# Patient Record
Sex: MALE | URBAN - METROPOLITAN AREA
[De-identification: names, ages, dates, MRNs, and addresses within clinical notes are randomized per-mention and may not be internally consistent; named-entity substitution may affect disease eponyms.]

---

## 2024-11-05 ENCOUNTER — TELEPHONE (OUTPATIENT)
Dept: TRANSPLANT | Facility: CLINIC | Age: 54
End: 2024-11-05
Payer: COMMERCIAL

## 2024-11-05 DIAGNOSIS — I50.9 CONGESTIVE HEART FAILURE, UNSPECIFIED HF CHRONICITY, UNSPECIFIED HEART FAILURE TYPE: Primary | ICD-10-CM

## 2025-01-30 ENCOUNTER — HOSPITAL ENCOUNTER (OUTPATIENT)
Dept: PULMONOLOGY | Facility: CLINIC | Age: 55
Discharge: HOME | End: 2025-01-30
Attending: INTERNAL MEDICINE
Payer: COMMERCIAL

## 2025-01-30 ENCOUNTER — OFFICE VISIT (OUTPATIENT)
Dept: TRANSPLANT | Facility: CLINIC | Age: 55
End: 2025-01-30
Attending: INTERNAL MEDICINE
Payer: COMMERCIAL

## 2025-01-30 ENCOUNTER — HOSPITAL ENCOUNTER (OUTPATIENT)
Dept: CARDIOLOGY | Facility: HOSPITAL | Age: 55
Discharge: HOME | End: 2025-01-30
Attending: INTERNAL MEDICINE
Payer: COMMERCIAL

## 2025-01-30 VITALS
WEIGHT: 257.94 LBS | HEART RATE: 70 BPM | BODY MASS INDEX: 33.37 KG/M2 | HEIGHT: 74 IN | DIASTOLIC BLOOD PRESSURE: 80 MMHG | HEIGHT: 74 IN | WEIGHT: 260 LBS | SYSTOLIC BLOOD PRESSURE: 113 MMHG | BODY MASS INDEX: 33.1 KG/M2

## 2025-01-30 VITALS
WEIGHT: 261.44 LBS | BODY MASS INDEX: 33.55 KG/M2 | SYSTOLIC BLOOD PRESSURE: 128 MMHG | HEART RATE: 69 BPM | DIASTOLIC BLOOD PRESSURE: 86 MMHG | HEIGHT: 74 IN

## 2025-01-30 DIAGNOSIS — Z86.73 HISTORY OF CARDIOEMBOLIC STROKE: ICD-10-CM

## 2025-01-30 DIAGNOSIS — I50.22 CHRONIC SYSTOLIC CONGESTIVE HEART FAILURE: Primary | ICD-10-CM

## 2025-01-30 DIAGNOSIS — I50.9 CONGESTIVE HEART FAILURE, UNSPECIFIED HF CHRONICITY, UNSPECIFIED HEART FAILURE TYPE: ICD-10-CM

## 2025-01-30 DIAGNOSIS — E78.2 MIXED HYPERLIPIDEMIA: ICD-10-CM

## 2025-01-30 DIAGNOSIS — I42.8 NONISCHEMIC CARDIOMYOPATHY: ICD-10-CM

## 2025-01-30 DIAGNOSIS — I10 PRIMARY HYPERTENSION: ICD-10-CM

## 2025-01-30 LAB
ASCENDING AORTA: 4.11 CM
AV AREA BY CONTINUOUS VTI: 4.8 CM2
AV INDEX (PROSTH): 1.01
AV LVOT MEAN GRADIENT: 3 MMHG
AV LVOT PEAK GRADIENT: 5 MMHG
AV MEAN GRADIENT: 3 MMHG
AV PEAK GRADIENT: 5 MMHG
AV VALVE AREA BY VELOCITY RATIO: 4.9 CM²
AV VALVE AREA: 5 CM2
AV VELOCITY RATIO: 1
BSA FOR ECHO PROCEDURE: 2.47 M2
CV ECHO LV RWT: 0.31 CM
DOP CALC AO PEAK VEL: 1.1 M/S
DOP CALC AO VTI: 16.6 CM
DOP CALC LVOT AREA: 4.9 CM2
DOP CALC LVOT DIAMETER: 2.5 CM
DOP CALC LVOT PEAK VEL: 1.1 M/S
DOP CALC LVOT STROKE VOLUME: 82.4 CM3
DOP CALCLVOT PEAK VEL VTI: 16.8 CM
E WAVE DECELERATION TIME: 229 MS
E/A RATIO: 0.63
E/E' RATIO: 10 M/S
ECHO EF ESTIMATED: 10 %
ECHO LV POSTERIOR WALL: 0.9 CM (ref 0.6–1.1)
FRACTIONAL SHORTENING: 6.8 % (ref 28–44)
INTERVENTRICULAR SEPTUM: 0.9 CM (ref 0.6–1.1)
IVC DIAMETER: 1.34 CM
IVRT: 140 MS
LA MAJOR: 5.8 CM
LA MINOR: 5.8 CM
LA WIDTH: 4.6 CM
LEFT ATRIUM SIZE: 4.6 CM
LEFT ATRIUM VOLUME INDEX: 43 ML/M2
LEFT ATRIUM VOLUME: 104 CM3
LEFT INTERNAL DIMENSION IN SYSTOLE: 5.5 CM (ref 2.1–4)
LEFT VENTRICLE DIASTOLIC VOLUME INDEX: 68.27 ML/M2
LEFT VENTRICLE DIASTOLIC VOLUME: 165.21 ML
LEFT VENTRICLE MASS INDEX: 86.6 G/M2
LEFT VENTRICLE SYSTOLIC VOLUME INDEX: 61.6 ML/M2
LEFT VENTRICLE SYSTOLIC VOLUME: 149.04 ML
LEFT VENTRICULAR INTERNAL DIMENSION IN DIASTOLE: 5.9 CM (ref 3.5–6)
LEFT VENTRICULAR MASS: 209.6 G
LV LATERAL E/E' RATIO: 8.6
LV SEPTAL E/E' RATIO: 10.8
MV PEAK A VEL: 0.68 M/S
MV PEAK E VEL: 0.43 M/S
OHS CV RV/LV RATIO: 0.73 CM
PULM VEIN S/D RATIO: 1.54
PV PEAK D VEL: 0.24 M/S
PV PEAK S VEL: 0.37 M/S
RA MAJOR: 5.08 CM
RA PRESSURE ESTIMATED: 3 MMHG
RA WIDTH: 4.39 CM
RIGHT ATRIAL AREA: 19.8 CM2
RIGHT VENTRICLE DIASTOLIC BASEL DIMENSION: 4.3 CM
RV TISSUE DOPPLER FREE WALL SYSTOLIC VELOCITY 1 (APICAL 4 CHAMBER VIEW): 12.48 CM/S
SINUS: 4.14 CM
STJ: 3.71 CM
TDI LATERAL: 0.05 M/S
TDI SEPTAL: 0.04 M/S
TDI: 0.05 M/S
TRICUSPID ANNULAR PLANE SYSTOLIC EXCURSION: 1.99 CM
Z-SCORE OF LEFT VENTRICULAR DIMENSION IN END DIASTOLE: -6.62
Z-SCORE OF LEFT VENTRICULAR DIMENSION IN END SYSTOLE: -1.72

## 2025-01-30 PROCEDURE — 99205 OFFICE O/P NEW HI 60 MIN: CPT | Mod: S$GLB,,, | Performed by: INTERNAL MEDICINE

## 2025-01-30 PROCEDURE — 1160F RVW MEDS BY RX/DR IN RCRD: CPT | Mod: CPTII,S$GLB,, | Performed by: INTERNAL MEDICINE

## 2025-01-30 PROCEDURE — 3079F DIAST BP 80-89 MM HG: CPT | Mod: CPTII,S$GLB,, | Performed by: INTERNAL MEDICINE

## 2025-01-30 PROCEDURE — C8929 TTE W OR WO FOL WCON,DOPPLER: HCPCS | Mod: TXP

## 2025-01-30 PROCEDURE — 4010F ACE/ARB THERAPY RXD/TAKEN: CPT | Mod: CPTII,S$GLB,, | Performed by: INTERNAL MEDICINE

## 2025-01-30 PROCEDURE — 3008F BODY MASS INDEX DOCD: CPT | Mod: CPTII,S$GLB,, | Performed by: INTERNAL MEDICINE

## 2025-01-30 PROCEDURE — 3074F SYST BP LT 130 MM HG: CPT | Mod: CPTII,S$GLB,, | Performed by: INTERNAL MEDICINE

## 2025-01-30 PROCEDURE — 94618 PULMONARY STRESS TESTING: CPT | Mod: S$GLB,,, | Performed by: INTERNAL MEDICINE

## 2025-01-30 PROCEDURE — 1159F MED LIST DOCD IN RCRD: CPT | Mod: CPTII,S$GLB,, | Performed by: INTERNAL MEDICINE

## 2025-01-30 PROCEDURE — 93306 TTE W/DOPPLER COMPLETE: CPT | Mod: 26,NTX,, | Performed by: INTERNAL MEDICINE

## 2025-01-30 PROCEDURE — 99999 PR PBB SHADOW E&M-EST. PATIENT-LVL IV: CPT | Mod: PBBFAC,TXP,, | Performed by: INTERNAL MEDICINE

## 2025-01-30 RX ORDER — EMPAGLIFLOZIN 10 MG/1
10 TABLET, FILM COATED ORAL
COMMUNITY
Start: 2024-11-18

## 2025-01-30 RX ORDER — SACUBITRIL AND VALSARTAN 49; 51 MG/1; MG/1
1 TABLET, FILM COATED ORAL 2 TIMES DAILY
COMMUNITY
End: 2025-01-30 | Stop reason: DRUGHIGH

## 2025-01-30 RX ORDER — GEMFIBROZIL 600 MG/1
600 TABLET, FILM COATED ORAL 2 TIMES DAILY
COMMUNITY

## 2025-01-30 RX ORDER — ASPIRIN 81 MG/1
81 TABLET ORAL
COMMUNITY

## 2025-01-30 RX ORDER — APIXABAN 5 MG/1
5 TABLET, FILM COATED ORAL 2 TIMES DAILY
COMMUNITY

## 2025-01-30 RX ORDER — SACUBITRIL AND VALSARTAN 97; 103 MG/1; MG/1
1 TABLET, FILM COATED ORAL 2 TIMES DAILY
Qty: 60 TABLET | Refills: 2 | Status: SHIPPED | OUTPATIENT
Start: 2025-01-30

## 2025-01-30 RX ORDER — METOPROLOL SUCCINATE 25 MG/1
25 TABLET, EXTENDED RELEASE ORAL DAILY
COMMUNITY

## 2025-01-30 RX ORDER — SPIRONOLACTONE 25 MG/1
1 TABLET ORAL DAILY
COMMUNITY
Start: 2024-11-11

## 2025-01-30 RX ORDER — SILDENAFIL 50 MG/1
50 TABLET, FILM COATED ORAL DAILY PRN
COMMUNITY

## 2025-01-30 RX ORDER — ATORVASTATIN CALCIUM 20 MG/1
20 TABLET, FILM COATED ORAL DAILY
COMMUNITY

## 2025-01-30 NOTE — PATIENT INSTRUCTIONS
Increase Entresto to  mg twice a day    Get lab check on this dose 2-3 weeks later    Return here 2 months later for special stress test

## 2025-01-30 NOTE — LETTER
January 30, 2025        Guero Concepcion  [address] [address]  [place] [address]  Phone: [phone]  Fax: [phone]             [id number]  [address]  [place] [address]  Phone: [phone]   Patient: Jim Lang   MR Number: [id number]   YOB: 1970   Date of Visit: 1/30/2025       Dear Dr. Guero Concepcion    Thank you for referring Jim Lang to me for evaluation. Attached you will find relevant portions of my assessment and plan of care.    If you have questions, please do not hesitate to call me. I look forward to following Jim Lang along with you.    Sincerely,    Angel Santillan Jr, MD    Enclosure    If you would like to receive this communication electronically, please contact [email or web address] or [phone] to request [employer] Link access.    [employer] Link is a tool which provides read-only access to select patient information with whom you have a relationship. Its easy to use and provides real time access to review your patients record including encounter summaries, notes, results, and demographic information.    If you feel you have received this communication in error or would no longer like to receive these types of communications, please e-mail [email or web address]

## 2025-01-30 NOTE — PROGRESS NOTES
Subjective:   Initial evaluation of heart transplant candidacy.    HPI:  Mr. Lang is a 55 y.o. year old Black or  male who has presents to be considered for advanced surgical options (LVAD/OHT) upon referral by Dr. Dante Ponce.  Failure due to a nonischemic cardiomyopathy.  He reports being told that his heart was enlarged at the age of 3.   He has suffered with heart failure for 7 years in an angiogram in 2018 did not demonstrate any coronary artery disease.  Most recent cardiac issue was a stroke May 31, 2024 and since that time he has been on Eliquis.  He does not recall being told anything about atrial fibrillation.  Presumably this was a cardioembolic source since he was treated with Eliquis.  He lives in [place] but went to [facility] in [place] when he suffered this stroke he was transferred there.    As a rule he does not experience any dyspnea on exertion.  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  He walks 1 mi every morning and 2 miles every evening at 820 minute/mile pace.  He denies PND  But sleeps on 2 pillows though this seems more related to general comfort than breathing.  No symptomatic arrhythmia.      PAST MEDICAL HISTORY:   Congestive heart failure   Nonischemic cardiomyopathy   Stroke May 31, 2024 presumably cardioembolic based upon available information without known atrial arrhythmia   Hypertension   Hyperlipidemia     OPERATIONS:   Placement of ICD    Current Outpatient Medications on File Prior to Visit   Medication Sig Dispense Refill    aspirin (ECOTRIN) 81 MG EC tablet Take 81 mg by mouth.      atorvastatin (LIPITOR) 20 MG tablet Take 20 mg by mouth once daily.      ELIQUIS 5 mg Tab Take 5 mg by mouth 2 (two) times daily.      gemfibroziL (LOPID) 600 MG tablet Take 600 mg by mouth 2 (two) times daily.      JARDIANCE 10 mg tablet Take 10 mg by mouth.      metoprolol succinate (TOPROL-XL) 25 MG 24  "hr tablet Take 25 mg by mouth once daily.      sildenafiL (VIAGRA) 50 MG tablet Take 50 mg by mouth daily as needed.      spironolactone (ALDACTONE) 25 MG tablet Take 1 tablet by mouth once daily.      ENTRESTO 49-51 mg per tablet Take 1 tablet by mouth 2 (two) times daily.       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  His father side of the family did have a history of heart disease and 1 paternal uncle  at the age of 62 of heart failure.  No other details available.    Mother 80 has hypertension   1 brother with lung disease   2 sisters hypertension     SOCIAL HISTORY:   He smokes 2 to 3 packs of cigarettes over the course of 1 week.  He quit smoking in 2018.    He drank on average 5 beers per day though he quit 4 years ago with a diagnosis of heart failure.    Work history as in HPI    Review of Systems   Constitutional: Negative for malaise/fatigue, weight gain and weight loss.   Respiratory:  Negative for cough and sputum production.    Hematologic/Lymphatic: Does not bruise/bleed easily.   Gastrointestinal:  Positive for constipation (occ but nothing of note). Negative for diarrhea, hematemesis and melena.   Neurological:  Negative for dizziness, focal weakness, light-headedness, paresthesias, sensory change and weakness.     Objective:   Blood pressure 128/86, pulse 69, height 6' 2" (1.88 m), weight 118.6 kg (261 lb 7.5 oz).body mass index is 33.57 kg/m².    Physical Exam  Constitutional:       Appearance: He is well-developed.      Comments: /86 (BP Location: Left arm, Patient Position: Sitting)   Pulse 69   Ht 6' 2" (1.88 m)   Wt 118.6 kg (261 lb 7.5 oz)   BMI 33.57 kg/m²    Friendly black male in no acute distress   HENT:      Head: Normocephalic and atraumatic.   Eyes:      General: No scleral icterus. "        Right eye: No discharge.         Left eye: No discharge.      Conjunctiva/sclera: Conjunctivae normal.   Neck:      Thyroid: No thyromegaly.      Vascular: No JVD.      Trachea: No tracheal deviation.   Cardiovascular:      Rate and Rhythm: Normal rate and regular rhythm.      Heart sounds: No murmur heard.     No gallop.   Pulmonary:      Effort: Pulmonary effort is normal.      Breath sounds: Normal breath sounds.   Abdominal:      General: Bowel sounds are normal. There is no distension.      Palpations: Abdomen is soft. There is no mass.      Tenderness: There is no abdominal tenderness. There is no guarding or rebound.   Musculoskeletal:         General: No swelling or tenderness.      Right lower leg: No edema.      Left lower leg: No edema.   Skin:     General: Skin is warm and dry.   Neurological:      General: No focal deficit present.      Mental Status: He is alert and oriented to person, place, and time. Mental status is at baseline.      Comments: Grossly intact   Psychiatric:         Mood and Affect: Mood normal.         Behavior: Behavior normal.         Thought Content: Thought content normal.         Judgment: Judgment normal.       Labs reviewed as follows:  Lab Results   Component Value Date     (H) 01/30/2025     (H) 05/31/2024     (H) 01/19/2024     Lab Results   Component Value Date     (H) 01/30/2025     01/30/2025    K 3.8 01/30/2025     01/30/2025    CO2 22 (L) 01/30/2025    BUN 13 01/30/2025    CREATININE 1.1 01/30/2025     (H) 01/30/2025    HGBA1C 6.5 (H) 08/12/2024    AST 13 01/30/2025    ALT 12 01/30/2025    ALBUMIN 3.7 01/30/2025    PROT 8.0 01/30/2025    BILITOT 0.4 01/30/2025    WBC 7.48 01/30/2025    HGB 14.4 01/30/2025    HCT 46.9 01/30/2025     01/30/2025    INR 0.9 10/15/2024    TSH 0.768 01/30/2025    CHOL 148 08/12/2024    HDL 36 (L) 08/12/2024    LDLCALC 67 08/12/2024    TRIG 279 (H) 08/12/2024 1/30/2025 6 minute  walk test  427 m    1/30/2025 ECHO reviewed--he was just recently started on entresto    Left Ventricle: The left ventricle is mildly dilated. Normal wall thickness. Global hypokinesis present. There is severely reduced systolic function with a visually estimated ejection fraction of 20 - 25%.    Right Ventricle: Mild right ventricular enlargement. Wall thickness is normal. Systolic function is normal. Pacemaker lead present in the ventricle.    Left Atrium: Left atrium is moderately dilated.    Right Atrium: Right atrium is mildly dilated.    Pulmonic Valve: There is mild regurgitation.    IVC/SVC: Normal venous pressure at 3 mmHg.     6/1/2024 ECHO LADY zeyad ZAMAN    Left Ventricle: Severely decreased (<20%) ejection fraction. Grade II   (moderate) left ventricular diastolic dysfunction. There is global   hypokinesis with regional variation.     Aortic Valve: The valve is tricuspid. There is calcification without   stenosis of the aortic valve present.     Mitral Valve: There is secondary mitral regurgitation. Mild mitral   regurgitation.    Tricuspid Valve: Moderate tricuspid regurgitation. RVSP is   approximately 55 mmHG.     Left Atrium: Negative bubble study for right to left shunt.     Left Ventricle   The left ventricular cavity size is moderately dilated. Normal wall thickness observed. Severely decreased (<20%) ejection fraction. Grade II (moderate) left ventricular diastolic dysfunction. There is global hypokinesis with regional variation.     Right Ventricle   The right ventricular cavity size is mildly dilated. Severely reduced systolic function. ICD lead present in the ventricle.     Left Atrium   Left atrium is moderately dilated. Left atrium volume index is moderately increased. Negative bubble study for right to left shunt.     Right Atrium   Not well visualized.     IVC/SVC   Elevated central venous pressure (15-20 mm Hg).     Mitral Valve   Normal mitral valve structure. No stenosis. There is  secondary mitral regurgitation. Mild mitral regurgitation.     Tricuspid Valve   Tricuspid valve leaflets are thin and pliable.. Moderate tricuspid regurgitation. RVSP is approximately 55 mmHG.     Aortic Valve   The valve is tricuspid. There is calcification without stenosis of the aortic valve present.No aortic stenosis. No aortic regurgitation.     Pulmonic Valve   Normal valve structure. Trace transvalvular regurgitation.     Ascending Aorta   The sinus of Valsalva is normal. The sinotubular junction is mildly dilated. The ascending aorta is mildly dilated.     Pericardium   No pericardial effusion.         Most Recent Labs/Data:  EC24  Normal sinus rhythm   Possible Left atrial enlargement   Left axis deviation   Minimal voltage criteria for LVH, may be normal variant ( Aurora product )   Abnormal QRS-T angle, consider primary T wave abnormality     Echo:24 with bubble study  Left Ventricle: Severely decreased (<20%) ejection fraction. Grade II (moderate) left ventricular diastolic dysfunction. There is global hypokinesis with regional variation.  Aortic Valve: The valve is tricuspid. There is calcification without stenosis of the aortic valve present.  Mitral Valve: There is secondary mitral regurgitation. Mild mitral regurgitation.  Tricuspid Valve: Moderate tricuspid regurgitation. RVSP is approximately 55 mmHG.  Left Atrium: Negative bubble study for right to left shunt.    9/15/2022  Mildly dilated left ventricle with hypokinesis of the posterolateral wall. Ejection fraction is estimated at about 30%  Right ventricle is normal in size. ICD lead is noted  Mild left atrial enlargement  No pericardial effusion  Trace mitral regurgitation  Mild tricuspid regurgitation with systolic pulmonary pressure estimated at 28 mmHg    18  Interpretation Summary  Dilated left ventricle with severely depressed systolic function  Mild mitral regurgitation  Myocardial Findings    Left Ventricle The left  ventricular cavity size is moderately dilated.Severely (20-25%) decreased ejection fraction. Mild concentric hypertrophy observed.   Right Ventricle Normal right ventricle cavity size and ejection fraction.   RV mid: 3.41   Left Atrium Cavity is mildly dilated.   Right Atrium Normal cavity size.   Mitral Valve Normal valve structure. No mitral stenosis. Mild mitral regurgitation.   Tricuspid Valve Normal tricuspid valve structure. Trace tricuspid regurgitation. Mild pulmonary hypertension present.   Aortic Valve The valve is tricuspid. No aortic stenosis. No aortic regurgitation.   Pulmonic Valve The pulmonic valve was not well visualized.   Ascending Aorta Visualized portions of the aortic root and proximal ascending aorta appear normal in size and contour.   Pericardium Pericardium is normal. No pericardial effusion.     Device interrogation: 10/11/2024  [employer] single-chamber ICD. Battery life 4.3 years.  Normal function and lead thresholds. VVI with base rate 45.  less than 1%.1 episode of VT on 10/4/24 with successful ATP. No changes made.    8/13/2018 Cardiac cath  HEMODYNAMICS   1. Left pullback post left ventriculography.       a.     Left ventricular pressure 100/10 mmHg       b.     Aortic pressure 100/80 mmHg the gradient across the aortic        valve is 0   2. Selective coronary arteriograms:.       a.     Left main:  The left main is normal       b.     The left anterior descending is free of disease.  Diagonal   branches are normal       c.     The circumflex gives 2 obtuse marginals which are free of   disease.  Circumflex proper is free of disease       d.     The right coronary artery is normal   3.Left Ventriculography: Shows severe diffuse left ventricular hypokinesis   with dilated ventricle and ejection fraction around 20%     CONCLUSIONS   No obstructive coronary disease   Severe systolic left ventricular dysfunction     Assessment:      1. Chronic systolic congestive heart failure    2.  Nonischemic cardiomyopathy    3. Primary hypertension    4. Mixed hyperlipidemia    5. History of cardioembolic stroke        Plan:   Increase Entresto to  mg BID and obtain BMP 2-3 weeks later    Obtain BMP, BNP, EKG, CPX and ECHO and see me 3 months--I am not ordering RHC since doing so well clinically favor these tests first    Patient is now NYHA II ACC stage C    Recommend 2 gram sodium restriction and 1500cc fluid restriction.  Encourage physical activity with graded exercise program.  Requested patient to weigh themselves daily, and to notify us if their weight increases by more than 3 lbs in 1 day or 5 lbs in 1 week.     Transplant Candidacy: Patient is a 55 y.o. year old male with heart failure is being seen for possible LVAD and OHT. In my opinion, he is  an excellent LVAD, OHT candidate though at this time he does not appear to be sick enough with NYHA class at most 2. Patient did not meet with MCS and/or pre-transplant coordinator at the end of this visit for workup. he  will return for CPX, echo, BMP and BNP in 3 months.   Today increasing Entresto to  mg twice daily.  While beta-blocker dose is low his resting heart rate less than 70 thus target achieved.  He will continue to follow up with his attending physicians in [place].   I am going to have him followed in heart failure as opposed to pre transplant based upon current symptom level and 6 minute walk test.    UNOS Patient Status  Functional Status: 100% - Normal, no complaints, no evidence of disease  Physical Capacity: No Limitations  Working for Income: yes  If yes, working activity level: Working Full Time    Advance Care Planning     Date: 01/30/2025   Discussed healthcare power-of- but at this time not acted upon    Angel Santillan Jr, MD

## 2025-01-30 NOTE — Clinical Note
He will be followed in heart failure as opposed to pre transplant  Are failure nurses he will be getting a BMP in 2-3 weeks at home in [facility] put on your list to track result thank you

## 2025-01-30 NOTE — PROCEDURES
Jim Lang is a 55 y.o.   male patient, who presents for a 6 minute walk test ordered by MD Tyrell.  The diagnosis is Pre Heart Transplant; Cardiomyopathy.  The patient's BMI is 33.4 kg/m2.  Predicted distance (lower limit of normal) is 404.05 meters.      Test Results:    The test was completed without stopping.  The total time walked was 360 seconds.  During walking, the patient reported:  No complaints.  The patient used no assistive devices during testing.     01/30/2025---------Distance: 426.72 meters (1400 feet)     O2 Sat % Supplemental Oxygen Heart Rate Blood Pressure Joy Scale   Pre-exercise  (Resting) 98 % Room Air 79 bpm 127/86 mmHg 0   During Exercise 97 % Room Air 93 bpm 133/86 mmHg 0   Post-exercise  (Recovery) 98 % Room Air  83 bpm       Recovery Time: 45 seconds    Performing nurse/tech: estopinal RRT      PREVIOUS STUDY:   The patient has not had a previous study.      CLINICAL INTERPRETATION:  Six minute walk distance is 426.72 meters (1400 feet) with no dyspnea.  During exercise, there was no significant desaturation while breathing room air.  Both blood pressure and heart rate remained stable with walking.  The patient did not report non-pulmonary symptoms during exercise.  No previous study performed.  Based upon age and body mass index, exercise capacity is normal.

## 2025-01-31 ENCOUNTER — DOCUMENTATION ONLY (OUTPATIENT)
Dept: TRANSPLANT | Facility: CLINIC | Age: 55
End: 2025-01-31
Payer: COMMERCIAL

## 2025-01-31 NOTE — PROGRESS NOTES
Care of patient is being transferred to CHF section from Preht section, per Dr. Santillan.    Dx:Nonischemic Cardiomyopathy  Reason: Sent to medical management, does not meet criteria for advanced options (too well)  Pt is not on home inotrope therapy.    Per Dr. Santillan, patient will have repeat BMP in 2-3 weeks in [place] at [facility].

## 2025-02-04 DIAGNOSIS — I50.22 CHRONIC SYSTOLIC CONGESTIVE HEART FAILURE: Primary | ICD-10-CM

## 2025-04-03 ENCOUNTER — TELEPHONE (OUTPATIENT)
Dept: TRANSPLANT | Facility: CLINIC | Age: 55
End: 2025-04-03
Payer: COMMERCIAL

## 2025-04-12 DIAGNOSIS — I50.22 CHRONIC SYSTOLIC CONGESTIVE HEART FAILURE: ICD-10-CM

## 2025-04-18 RX ORDER — SACUBITRIL AND VALSARTAN 97; 103 MG/1; MG/1
1 TABLET, FILM COATED ORAL 2 TIMES DAILY
Qty: 60 TABLET | Refills: 2 | Status: SHIPPED | OUTPATIENT
Start: 2025-04-18